# Patient Record
Sex: MALE | Race: WHITE | NOT HISPANIC OR LATINO | Employment: FULL TIME | URBAN - METROPOLITAN AREA
[De-identification: names, ages, dates, MRNs, and addresses within clinical notes are randomized per-mention and may not be internally consistent; named-entity substitution may affect disease eponyms.]

---

## 2017-02-09 RX ORDER — GLIMEPIRIDE 4 MG/1
4 TABLET ORAL
Qty: 30 TABLET | Refills: 1 | Status: SHIPPED | OUTPATIENT
Start: 2017-02-09 | End: 2017-03-14 | Stop reason: SDUPTHER

## 2017-03-14 RX ORDER — PAROXETINE HYDROCHLORIDE 20 MG/1
20 TABLET, FILM COATED ORAL EVERY MORNING
Qty: 30 TABLET | Refills: 1 | Status: SHIPPED | OUTPATIENT
Start: 2017-03-14 | End: 2017-03-24 | Stop reason: SDUPTHER

## 2017-03-14 RX ORDER — METFORMIN HYDROCHLORIDE 500 MG/1
500 TABLET, EXTENDED RELEASE ORAL
Qty: 30 TABLET | Refills: 1 | Status: SHIPPED | OUTPATIENT
Start: 2017-03-14 | End: 2017-03-24 | Stop reason: SDUPTHER

## 2017-03-14 RX ORDER — GLIMEPIRIDE 4 MG/1
4 TABLET ORAL
Qty: 30 TABLET | Refills: 1 | Status: SHIPPED | OUTPATIENT
Start: 2017-03-14 | End: 2017-03-24 | Stop reason: SDUPTHER

## 2017-03-14 NOTE — TELEPHONE ENCOUNTER
----- Message from Thad Mcmahan sent at 3/14/2017  1:29 PM CDT -----  Contact: Pt  Pt is returning a call. Pt states his Rx numbers are:  Metformin  [id number]  Paroxetine [id number]  Glimpiride  [id number]  He can be reached at [phone]    [address] [place]  [address] [facility]  [address] [facility]  [place] [address]  Phone: [phone] Fax: [phone]

## 2017-03-14 NOTE — TELEPHONE ENCOUNTER
----- Message from Manuel Mixes sent at 3/14/2017  6:57 AM CDT -----  Contact: [phone]   Pt would like refills called in on Rx medication until scheduled appt on Next Friday.  Please call back @ [phone].  Thanks-[facility]       Pt Uses:    [address] [place] - [address]  [address] [facility] [place] [address]  Phone: [phone] Fax: [phone]

## 2017-03-19 RX ORDER — METFORMIN HYDROCHLORIDE 500 MG/1
TABLET, EXTENDED RELEASE ORAL
Qty: 30 TABLET | Refills: 0 | OUTPATIENT
Start: 2017-03-19

## 2017-03-20 ENCOUNTER — TELEPHONE (OUTPATIENT)
Dept: DIABETES | Facility: CLINIC | Age: 52
End: 2017-03-20

## 2017-03-20 NOTE — TELEPHONE ENCOUNTER
----- Message from Becki Mcmahan sent at 3/20/2017  9:59 AM CDT -----  States calling to check the status of extension of medication. Please adv/call [phone].//thanks. cw

## 2017-03-24 ENCOUNTER — OFFICE VISIT (OUTPATIENT)
Dept: DIABETES | Facility: CLINIC | Age: 52
End: 2017-03-24

## 2017-03-24 VITALS
DIASTOLIC BLOOD PRESSURE: 92 MMHG | WEIGHT: 174.19 LBS | BODY MASS INDEX: 25.8 KG/M2 | HEIGHT: 69 IN | SYSTOLIC BLOOD PRESSURE: 144 MMHG

## 2017-03-24 DIAGNOSIS — R03.0 BLOOD PRESSURE ELEVATED WITHOUT HISTORY OF HTN: ICD-10-CM

## 2017-03-24 PROCEDURE — 99212 OFFICE O/P EST SF 10 MIN: CPT | Mod: S$PBB,,, | Performed by: PHYSICIAN ASSISTANT

## 2017-03-24 PROCEDURE — 99999 PR PBB SHADOW E&M-EST. PATIENT-LVL II: CPT | Mod: PBBFAC,,, | Performed by: PHYSICIAN ASSISTANT

## 2017-03-24 PROCEDURE — 99212 OFFICE O/P EST SF 10 MIN: CPT | Mod: PBBFAC,PO | Performed by: PHYSICIAN ASSISTANT

## 2017-03-24 RX ORDER — GLIMEPIRIDE 4 MG/1
4 TABLET ORAL
Qty: 30 TABLET | Refills: 11 | Status: SHIPPED | OUTPATIENT
Start: 2017-03-24 | End: 2017-05-16 | Stop reason: SDUPTHER

## 2017-03-24 RX ORDER — SILDENAFIL 100 MG/1
100 TABLET, FILM COATED ORAL DAILY PRN
Qty: 20 TABLET | Refills: 11 | Status: SHIPPED | OUTPATIENT
Start: 2017-03-24 | End: 2018-05-17

## 2017-03-24 RX ORDER — PIOGLITAZONEHYDROCHLORIDE 15 MG/1
15 TABLET ORAL DAILY
Qty: 30 TABLET | Refills: 11 | Status: SHIPPED | OUTPATIENT
Start: 2017-03-24 | End: 2018-05-16 | Stop reason: SDUPTHER

## 2017-03-24 RX ORDER — PAROXETINE HYDROCHLORIDE 20 MG/1
20 TABLET, FILM COATED ORAL EVERY MORNING
Qty: 30 TABLET | Refills: 11 | Status: SHIPPED | OUTPATIENT
Start: 2017-03-24 | End: 2017-05-16 | Stop reason: SDUPTHER

## 2017-03-24 RX ORDER — METFORMIN HYDROCHLORIDE 500 MG/1
500 TABLET, EXTENDED RELEASE ORAL
Qty: 30 TABLET | Refills: 11 | Status: SHIPPED | OUTPATIENT
Start: 2017-03-24 | End: 2018-04-02 | Stop reason: SDUPTHER

## 2017-03-24 NOTE — PROGRESS NOTES
Subjective:      Patient ID: Vasu Garg Jr. is a 52 y.o. male.    PCP: Deacon Roemro Jr, PA-C      Vasu Garg is a pleasant 52 y.o. male presenting to follow up on diabetes mellitus. He has had diabetes for 10 or more years. His last visit in Diabetes Management was Visit date not found Since that time he has had no improvement in his symptoms.  His current concerns are glycemia..    He denies any hospital admissions, emergency room visits, hypoglycemia, syncope, diaphoresis, chest pain, or dyspnea.    He has gained 6 pounds since last visit. His BMI is 25.72    His blood sugar in the clinic today was: 267 mg/dl on 3/24/2017 @ 12:58 PM      We discussed the American diabetes Association recommendations:  hemoglobin A1c below 7.0%; all diabetics should be on statins unless contraindicated; one aspirin daily unless contraindicated; fasting blood sugar between 80 and 130 mg/dL; postprandial blood sugar below 180 mg/dl; prevention of hypoglycemia, may adjust goals to higher levels if persistent; ACE or ARB therapy if not contraindicated; and maintain in an ideal body weight with BMI below 25.    Vasu is compliant most of the time with DM medications.     Vasu is compliant most of the time with lifestyle modifications to include activity and meal planning.       STANDARDS OF CARE:  Eye doctor: Dr. franklin, last exam unk.  Dental exam: Recommend regular exams; denies gums bleeding.  Podiatry doctor:    ACTIVITY LEVEL: He exercises rarely.  MEAL PLANNING: Number of meals per day - 3. Number of snacks per day - 2.  Per dietary recall, patient is not limiting carbohydrates, saturated fats and sodium.   BLOOD GLUCOSE TESTING: Self-monitoring with   SOCIAL HISTORY: . Lives with spouse. Employed;  no tobacco use    Review of patient's allergies indicates:  No Known Allergies    History reviewed. No pertinent past medical history.    Review of Systems   Constitutional: Negative.  Negative for activity  change, appetite change, chills, diaphoresis, fatigue, fever and unexpected weight change.   HENT: Negative.  Negative for dental problem, facial swelling, hearing loss, nosebleeds, trouble swallowing and voice change.    Eyes: Negative.  Negative for photophobia, pain, discharge, redness, itching and visual disturbance.   Respiratory: Negative.  Negative for cough, choking, chest tightness, shortness of breath and wheezing.    Cardiovascular: Negative.  Negative for chest pain, palpitations and leg swelling.   Gastrointestinal: Negative.  Negative for abdominal distention, abdominal pain, blood in stool, constipation, diarrhea, nausea and vomiting.   Endocrine: Negative.  Negative for cold intolerance, heat intolerance, polydipsia, polyphagia and polyuria.   Genitourinary: Negative.  Negative for decreased urine volume, difficulty urinating, dysuria, frequency, scrotal swelling, testicular pain and urgency.   Musculoskeletal: Negative.  Negative for arthralgias, back pain, gait problem, joint swelling, myalgias, neck pain and neck stiffness.   Skin: Negative.  Negative for color change, pallor, rash and wound.   Allergic/Immunologic: Negative.  Negative for environmental allergies, food allergies and immunocompromised state.   Neurological: Negative.  Negative for dizziness, tremors, seizures, syncope, facial asymmetry, speech difficulty, weakness, light-headedness, numbness and headaches.   Hematological: Negative.  Negative for adenopathy. Does not bruise/bleed easily.   Psychiatric/Behavioral: Negative.  Negative for agitation, behavioral problems, confusion, decreased concentration, dysphoric mood, hallucinations, self-injury, sleep disturbance and suicidal ideas. The patient is not nervous/anxious and is not hyperactive.       Objective:     Vitals - 1 value per visit 2/17/2016 3/24/2017   SYSTOLIC 126 144   DIASTOLIC 82 92   PULSE 84 -   TEMPERATURE 99.1 -   RESPIRATIONS 16 -   Weight (lb) 168.65 174.16  "  HEIGHT 5' 7" 5' 9"   BODY MASS INDEX 26.41 25.72   VISIT REPORT - -       Physical Exam   Constitutional: He is oriented to person, place, and time. He appears well-developed and well-nourished. He is cooperative.  Non-toxic appearance. He does not have a sickly appearance. He does not appear ill. No distress. He is not intubated.   HENT:   Head: Normocephalic and atraumatic. Not macrocephalic and not microcephalic. Head is without raccoon's eyes, without Gordon's sign, without abrasion, without contusion, without laceration, without right periorbital erythema and without left periorbital erythema. Hair is normal.   Right Ear: External ear normal. No lacerations. No drainage, swelling or tenderness. No foreign bodies. No mastoid tenderness. Tympanic membrane is not injected, not scarred, not perforated, not erythematous, not retracted and not bulging. Tympanic membrane mobility is normal. No middle ear effusion. No hemotympanum. No decreased hearing is noted.   Left Ear: External ear normal. No lacerations. No drainage, swelling or tenderness. No foreign bodies. No mastoid tenderness. Tympanic membrane is not injected, not scarred, not perforated, not erythematous, not retracted and not bulging. Tympanic membrane mobility is normal.  No middle ear effusion. No hemotympanum. No decreased hearing is noted.   Nose: Nose normal.   Mouth/Throat: Oropharynx is clear and moist.   Eyes: EOM and lids are normal. Pupils are equal, round, and reactive to light. Right eye exhibits no chemosis, no discharge, no exudate and no hordeolum. No foreign body present in the right eye. Left eye exhibits no chemosis, no discharge, no exudate and no hordeolum. No foreign body present in the left eye. Right conjunctiva is not injected. Right conjunctiva has no hemorrhage. Left conjunctiva is not injected. Left conjunctiva has no hemorrhage. No scleral icterus. Right eye exhibits normal extraocular motion and no nystagmus. Left eye " exhibits normal extraocular motion and no nystagmus. Right pupil is round and reactive. Left pupil is round and reactive. Pupils are equal.   Neck: Normal range of motion, full passive range of motion without pain and phonation normal. Neck supple. Normal carotid pulses, no hepatojugular reflux and no JVD present. No tracheal tenderness, no spinous process tenderness and no muscular tenderness present. Carotid bruit is not present. No rigidity. No tracheal deviation, no edema, no erythema and normal range of motion present. No thyroid mass and no thyromegaly present.   Cardiovascular: Normal rate, regular rhythm, normal heart sounds and intact distal pulses.   No extrasystoles are present. PMI is not displaced.  Exam reveals no gallop, no friction rub and no decreased pulses.    No murmur heard.  Pulses:       Carotid pulses are 2+ on the right side, and 2+ on the left side.       Radial pulses are 2+ on the right side, and 2+ on the left side.        Dorsalis pedis pulses are 2+ on the right side, and 2+ on the left side.        Posterior tibial pulses are 2+ on the right side, and 2+ on the left side.   Pulmonary/Chest: Effort normal and breath sounds normal. No accessory muscle usage or stridor. No apnea, no tachypnea and no bradypnea. He is not intubated. No respiratory distress. He has no decreased breath sounds. He has no wheezes. He has no rhonchi. He has no rales. He exhibits no tenderness.   Abdominal: Soft. Bowel sounds are normal. He exhibits no shifting dullness, no distension, no pulsatile liver, no fluid wave, no abdominal bruit, no ascites, no pulsatile midline mass and no mass. There is no hepatosplenomegaly, splenomegaly or hepatomegaly. There is no tenderness. There is no rigidity, no rebound, no guarding, no CVA tenderness and no tenderness at McBurney's point. No hernia. Hernia confirmed negative in the ventral area.   Musculoskeletal: Normal range of motion. He exhibits no edema or tenderness.         Right foot: There is normal range of motion and no deformity.        Left foot: There is normal range of motion and no deformity.   Feet:   Right Foot:   Protective Sensation: 6 sites tested. 6 sites sensed.   Skin Integrity: Negative for ulcer, blister, skin breakdown, erythema, warmth, callus or dry skin.   Left Foot:   Protective Sensation: 6 sites tested. 6 sites sensed.   Skin Integrity: Negative for ulcer, blister, skin breakdown, erythema, warmth, callus or dry skin.   Lymphadenopathy:        Head (right side): No submental, no submandibular, no tonsillar, no preauricular, no posterior auricular and no occipital adenopathy present.        Head (left side): No submental, no submandibular, no tonsillar, no preauricular, no posterior auricular and no occipital adenopathy present.     He has no cervical adenopathy.        Right cervical: No superficial cervical, no deep cervical and no posterior cervical adenopathy present.       Left cervical: No superficial cervical, no deep cervical and no posterior cervical adenopathy present.   Neurological: He is alert and oriented to person, place, and time. He has normal reflexes. He displays no atrophy and no tremor. No cranial nerve deficit or sensory deficit. He exhibits normal muscle tone. He displays no seizure activity. Coordination and gait normal.   Reflex Scores:       Bicep reflexes are 2+ on the right side and 2+ on the left side.       Brachioradialis reflexes are 2+ on the right side and 2+ on the left side.       Patellar reflexes are 2+ on the right side and 2+ on the left side.  Skin: Skin is warm and dry. No rash noted. No erythema. No pallor.   Psychiatric: He has a normal mood and affect. His mood appears not anxious. His affect is not angry, not blunt, not labile and not inappropriate. His speech is not rapid and/or pressured, not delayed, not tangential and not slurred. He is not agitated, not aggressive, not hyperactive, not slowed, not  withdrawn, not actively hallucinating and not combative. Thought content is not paranoid and not delusional. Cognition and memory are not impaired. He does not express impulsivity or inappropriate judgment. He does not exhibit a depressed mood. He expresses no homicidal and no suicidal ideation. He expresses no suicidal plans and no homicidal plans. He is communicative. He exhibits normal recent memory and normal remote memory. He is attentive.     Assessment:     1. Uncontrolled type 2 diabetes mellitus without complication, without long-term current use of insulin      Plan:     Vasu Garg is seen today for   1. Uncontrolled type 2 diabetes mellitus without complication, without long-term current use of insulin      We have discussed the etiology and treatment options associated with the diagnosis as well as alternatives. He has elected the following treatments.     Uncontrolled type 2 diabetes mellitus without complication, without long-term current use of insulin  Comments:  We'll add Actos 15 mg once daily; advised to check blood sugar more frequently; follow-up in 6 months; continue other current medications as directed.  Orders:  -     pioglitazone (ACTOS) 15 MG tablet; Take 1 tablet (15 mg total) by mouth once daily.  Dispense: 30 tablet; Refill: 11  -     glimepiride (AMARYL) 4 MG tablet; Take 1 tablet (4 mg total) by mouth before breakfast.  Dispense: 30 tablet; Refill: 11  -     metformin (GLUCOPHAGE XR) 500 MG 24 hr tablet; Take 1 tablet (500 mg total) by mouth daily with breakfast.  Dispense: 30 tablet; Refill: 11  -     paroxetine (PAXIL) 20 MG tablet; Take 1 tablet (20 mg total) by mouth every morning.  Dispense: 30 tablet; Refill: 11  -     sildenafil (VIAGRA) 100 MG tablet; Take 1 tablet (100 mg total) by mouth daily as needed for Erectile Dysfunction.  Dispense: 20 tablet; Refill: 11      1.) Patient was instructed to monitor blood glucose twice daily, fasting, and 2 hour post meal; if on  Multiple Daily Injections (MDI) he will need to have pre-meal blood glucose as well. Reminded to bring BG meter or record to each visit for review.  2.) Reviewed pathophysiology of type 2 diabetes, complications related to the disease, importance of annual dilated eye exam and self daily foot examination.  3.) Continue medications as prescribed Metformin; Amaryl; Paxil; Viagra. [facility] MyChart or Phone review in 1 week with BG records for adjustment of medication.  4.) Reviewed carb counting, portion control, importance of spacing meals throughout the day to prevent post prandial elevations. Recommended low saturated fat, low sodium diet to aid in control of hypertension and cholesterol.  5.) Discussed activity, benefits, methods, and precautions. Recommended patient start/continue some form of exercise and increase as tolerated to 60 minutes per day to facilitate weight loss and aid in control of BGs. Also reminded patient of WHO recommendation of 10,000 steps daily as a goal.   6.) A1C, TSH, Lipid Panel, CMP with eGFR and Micro/Creatinine per ADA protocol.  7.) Return to clinic in 6 months for follow up. Advised patient to call clinic with any questions or concerns.    A total of 45 minutes was spent in face to face time, of which 50 % was spent in counseling patient on disease process, complications, treatment, and side effects of medications.    The patient was explained the above plan and given opportunity to ask questions.  He understands, chooses and consents to this plan and accepts all the risks, which include but are not limited to the risks mentioned above.   He understands the alternative of having no testing, interventions or treatments at this time. He left content and without further questions.

## 2017-05-16 RX ORDER — GLIMEPIRIDE 4 MG/1
TABLET ORAL
Qty: 30 TABLET | Refills: 0 | Status: SHIPPED | OUTPATIENT
Start: 2017-05-16 | End: 2018-03-25 | Stop reason: SDUPTHER

## 2017-05-16 RX ORDER — PAROXETINE HYDROCHLORIDE 20 MG/1
TABLET, FILM COATED ORAL
Qty: 30 TABLET | Refills: 0 | Status: SHIPPED | OUTPATIENT
Start: 2017-05-16 | End: 2018-04-02 | Stop reason: SDUPTHER

## 2017-05-17 RX ORDER — GLIMEPIRIDE 4 MG/1
TABLET ORAL
Qty: 30 TABLET | Refills: 6 | Status: SHIPPED | OUTPATIENT
Start: 2017-05-17 | End: 2018-03-25 | Stop reason: SDUPTHER

## 2017-05-17 RX ORDER — PAROXETINE HYDROCHLORIDE 20 MG/1
TABLET, FILM COATED ORAL
Qty: 30 TABLET | Refills: 6 | Status: SHIPPED | OUTPATIENT
Start: 2017-05-17 | End: 2018-04-02 | Stop reason: SDUPTHER

## 2018-03-25 RX ORDER — GLIMEPIRIDE 4 MG/1
TABLET ORAL
Qty: 30 TABLET | Refills: 1 | Status: SHIPPED | OUTPATIENT
Start: 2018-03-25 | End: 2018-04-02 | Stop reason: SDUPTHER

## 2018-03-26 ENCOUNTER — TELEPHONE (OUTPATIENT)
Dept: DIABETES | Facility: CLINIC | Age: 53
End: 2018-03-26

## 2018-03-26 NOTE — TELEPHONE ENCOUNTER
Spoke with wife regarding appointment she stated he no longer have insurance and want to know what other options do she have. I told patient I will retain the information for her and give her a call back. She stated patient is at work and cant answer his phone.

## 2018-04-02 RX ORDER — PAROXETINE HYDROCHLORIDE 20 MG/1
TABLET, FILM COATED ORAL
Qty: 30 TABLET | Refills: 11 | Status: SHIPPED | OUTPATIENT
Start: 2018-04-02 | End: 2018-04-03 | Stop reason: SDUPTHER

## 2018-04-02 NOTE — TELEPHONE ENCOUNTER
----- Message from Neli Molina sent at 4/2/2018  8:48 AM CDT -----  Contact: spouse  Would like to consult with  nurse regarding rx diabetes  medication . Please call back at [phone].        Thanks,  Neli Molina

## 2018-04-03 RX ORDER — PAROXETINE HYDROCHLORIDE 20 MG/1
20 TABLET, FILM COATED ORAL EVERY MORNING
Qty: 30 TABLET | Refills: 6 | Status: SHIPPED | OUTPATIENT
Start: 2018-04-03 | End: 2018-05-17 | Stop reason: SDUPTHER

## 2018-04-03 RX ORDER — GLIMEPIRIDE 4 MG/1
4 TABLET ORAL
Qty: 30 TABLET | Refills: 1 | Status: SHIPPED | OUTPATIENT
Start: 2018-04-03 | End: 2018-05-17 | Stop reason: SDUPTHER

## 2018-04-03 RX ORDER — METFORMIN HYDROCHLORIDE 500 MG/1
500 TABLET, EXTENDED RELEASE ORAL
Qty: 30 TABLET | Refills: 1 | Status: SHIPPED | OUTPATIENT
Start: 2018-04-03 | End: 2018-05-16

## 2018-04-09 ENCOUNTER — TELEPHONE (OUTPATIENT)
Dept: DIABETES | Facility: CLINIC | Age: 53
End: 2018-04-09

## 2018-04-09 NOTE — TELEPHONE ENCOUNTER
Spoke with wife told her the prescription was refilled on 04/03/2018 to check with pharmacy. Wife verbalized understanding.

## 2018-04-09 NOTE — TELEPHONE ENCOUNTER
----- Message from Meredith Staley sent at 4/9/2018  7:39 AM CDT -----  Contact: Mary beck wife  Mary needs to speak to the nurse regarding his paroxotine 20 mg being called in one more month to [facility] [address]/please call [phone]/ma

## 2018-04-09 NOTE — TELEPHONE ENCOUNTER
Spoke with patient wife (marleni) stated that Mr. Leone is in need of Paroxetine because she concern about him loosing his job. Wife stated patient is very angry and aggressive without this medication and his BG is spiking up. Wife stated patient been without medication for a week. Wife stated patient does not have insurance at the moment. Please advise.

## 2018-05-16 ENCOUNTER — TELEPHONE (OUTPATIENT)
Dept: INTERNAL MEDICINE | Facility: CLINIC | Age: 53
End: 2018-05-16

## 2018-05-16 RX ORDER — METFORMIN HYDROCHLORIDE 500 MG/1
TABLET, EXTENDED RELEASE ORAL
Qty: 30 TABLET | Refills: 1 | Status: SHIPPED | OUTPATIENT
Start: 2018-05-16 | End: 2018-05-17

## 2018-05-16 RX ORDER — PIOGLITAZONEHYDROCHLORIDE 15 MG/1
TABLET ORAL
Qty: 30 TABLET | Refills: 1 | Status: SHIPPED | OUTPATIENT
Start: 2018-05-16 | End: 2018-05-17 | Stop reason: SDUPTHER

## 2018-05-16 NOTE — TELEPHONE ENCOUNTER
----- Message from Manuel Tatum sent at 5/16/2018  9:21 AM CDT -----  Contact: Soila Hernandez- [phone]  Would like to consult with the nurse about blood sugar readings,  Pt's blood sugar is high and need a refill on rx medication.  Please call back at [phone].  Thx-AH

## 2018-05-16 NOTE — TELEPHONE ENCOUNTER
II spoke with the pharmacy [facility] [address] [facility]. And they says he has 4 refills on the metformin and 1 refill on the glimepiride. She is to pick them up. [place] in diabetes  says diabetes in now under primary care and the co pay should be the same as primary care and not specialty. The patient does not have insurance right now. I advised her to check with Mr. Worthy to verify and maybe schedule with him instead right now because of money. She will check with them.

## 2018-05-16 NOTE — TELEPHONE ENCOUNTER
II spoke with the pharmacy [facility] [address] [facility]. And they says he has 4 refills on the metformin and 1 refill on the glimepiride. She is to pick them up. [facility] [place] in diabetes  says diabetes in now under primary care and the co pay should be the same as primary care and not specialty. The patient does not have insurance right now. I advised her to check with Mr. Worthy to verify and maybe schedule with him instead right now because of money. She will check with them.

## 2018-05-17 ENCOUNTER — OFFICE VISIT (OUTPATIENT)
Dept: INTERNAL MEDICINE | Facility: CLINIC | Age: 53
End: 2018-05-17

## 2018-05-17 VITALS
WEIGHT: 178.38 LBS | DIASTOLIC BLOOD PRESSURE: 80 MMHG | OXYGEN SATURATION: 96 % | HEIGHT: 69 IN | BODY MASS INDEX: 26.42 KG/M2 | TEMPERATURE: 98 F | SYSTOLIC BLOOD PRESSURE: 126 MMHG | HEART RATE: 104 BPM

## 2018-05-17 DIAGNOSIS — N52.9 ERECTILE DYSFUNCTION, UNSPECIFIED ERECTILE DYSFUNCTION TYPE: ICD-10-CM

## 2018-05-17 DIAGNOSIS — Z59.9 FINANCIAL DIFFICULTIES: ICD-10-CM

## 2018-05-17 DIAGNOSIS — F39 MOOD DISORDER: ICD-10-CM

## 2018-05-17 PROCEDURE — 99203 OFFICE O/P NEW LOW 30 MIN: CPT | Mod: S$PBB,,, | Performed by: FAMILY MEDICINE

## 2018-05-17 PROCEDURE — 99999 PR PBB SHADOW E&M-EST. PATIENT-LVL III: CPT | Mod: PBBFAC,,, | Performed by: FAMILY MEDICINE

## 2018-05-17 PROCEDURE — 99213 OFFICE O/P EST LOW 20 MIN: CPT | Mod: PBBFAC | Performed by: FAMILY MEDICINE

## 2018-05-17 RX ORDER — PIOGLITAZONEHYDROCHLORIDE 15 MG/1
15 TABLET ORAL DAILY
Qty: 90 TABLET | Refills: 1 | Status: SHIPPED | OUTPATIENT
Start: 2018-05-17

## 2018-05-17 RX ORDER — PAROXETINE HYDROCHLORIDE 20 MG/1
20 TABLET, FILM COATED ORAL EVERY MORNING
Qty: 90 TABLET | Refills: 1 | Status: SHIPPED | OUTPATIENT
Start: 2018-05-17

## 2018-05-17 RX ORDER — GLIMEPIRIDE 4 MG/1
4 TABLET ORAL
Qty: 90 TABLET | Refills: 1 | Status: SHIPPED | OUTPATIENT
Start: 2018-05-17

## 2018-05-17 RX ORDER — SILDENAFIL 50 MG/1
50 TABLET, FILM COATED ORAL DAILY PRN
Qty: 10 TABLET | Refills: 0 | Status: SHIPPED | OUTPATIENT
Start: 2018-05-17 | End: 2019-05-17

## 2018-05-17 RX ORDER — METFORMIN HYDROCHLORIDE 500 MG/1
500 TABLET ORAL 2 TIMES DAILY WITH MEALS
Qty: 90 TABLET | Refills: 0 | Status: SHIPPED | OUTPATIENT
Start: 2018-05-17 | End: 2018-08-27 | Stop reason: SDUPTHER

## 2018-05-17 SDOH — SOCIAL DETERMINANTS OF HEALTH (SDOH): PROBLEM RELATED TO HOUSING AND ECONOMIC CIRCUMSTANCES, UNSPECIFIED: Z59.9

## 2018-05-17 NOTE — PROGRESS NOTES
Subjective:       Patient ID: Vasu Garg Jr. is a 53 y.o. male.    Chief Complaint: Establish Care    HPI     53    PMH   Diabetes,  Anger?   Started on Paxil  Has improve mood.      10 years ago -   Diagnosis of diabetes.    Formerly went to Diabetic education.     Wife reports he doesn't handle his DM well.  Drinks coke zero.    Lost insurance.  Private pay.     Saw CHUCHO Romero -   Was given 1 year of prescriptions.  Was going to follow up - was going to be $500 as he is a Diabetic .     Has had severely elevated blood glucose.   No idea what last A1c was.         Review of Systems   Constitutional: Negative for chills and fever.   HENT: Negative for trouble swallowing.    Eyes: Negative for visual disturbance.   Respiratory: Negative for shortness of breath.    Cardiovascular: Negative for chest pain.   Gastrointestinal: Negative for constipation and diarrhea.   Genitourinary: Negative for difficulty urinating.   Musculoskeletal: Negative for gait problem.   Skin: Negative for rash.   Neurological: Negative for dizziness and light-headedness.   Psychiatric/Behavioral: Negative for dysphoric mood.       Objective:       Vitals:    05/17/18 1610   BP: 126/80   Pulse: 104   Temp: 97.8 °F (36.6 °C)       Physical Exam   Constitutional: He is oriented to person, place, and time. He appears well-developed and well-nourished. No distress.   HENT:   Head: Normocephalic and atraumatic.   Right Ear: Hearing, tympanic membrane, external ear and ear canal normal.   Left Ear: Hearing, tympanic membrane, external ear and ear canal normal.   Nose: Nose normal. Right sinus exhibits no maxillary sinus tenderness and no frontal sinus tenderness. Left sinus exhibits no maxillary sinus tenderness and no frontal sinus tenderness.   Mouth/Throat: Uvula is midline, oropharynx is clear and moist and mucous membranes are normal.   Eyes: Conjunctivae are normal. Right eye exhibits no discharge. Left eye  exhibits no discharge.   Neck: Trachea normal, normal range of motion and full passive range of motion without pain.   Cardiovascular: Normal rate, regular rhythm, normal heart sounds and intact distal pulses.    Pulmonary/Chest: Effort normal and breath sounds normal. No respiratory distress. He has no decreased breath sounds. He has no wheezes.   Abdominal: Soft. Normal appearance and bowel sounds are normal. He exhibits no distension and no mass. There is no tenderness. There is no guarding. No hernia.   Musculoskeletal: Normal range of motion. He exhibits no edema or deformity.   Lymphadenopathy:     He has no cervical adenopathy.   Neurological: He is alert and oriented to person, place, and time.   Skin: Skin is warm, dry and intact. No rash noted. No erythema. No pallor.   Psychiatric: He has a normal mood and affect. His speech is normal and behavior is normal. Thought content normal.       Assessment:       1. Uncontrolled type 2 diabetes mellitus without complication, without long-term current use of insulin    2. Erectile dysfunction, unspecified erectile dysfunction type    3. Financial difficulties    4. Mood disorder        Plan:   Uncontrolled type 2 diabetes mellitus without complication, without long-term current use of insulin    This is a complicated and unfortunate case. Patient has what I suspect to be uncontrolled and severe DM II. He also has severe financial limitations. He adamently refuses insulin therapy - we discussed this at risk and my concern oral medications would not be sufficient and potentially hazardous given his unknown renal / hepatic / electrolyte status. Informed him I will continue medication prescription but he is assuming a risk of taking medications without appropriate lab work. I ordered labs- and encouraged him to inquire fee for such and emphasized important of at minimum obtaining CMP and A1c. Also discussed what is needed for appropriate DM II mngt - eye exam, foot  exam, lipid control, statin use, Renal monitoring, ACE-I use, goal A1c of 7, etc    Additionally concerned patient does not manage his diet appropriately at all - given his wife's concern he doesn't help himself nutritionally speaking.     Advised he seek out [facility] or income based clinics for closer control as I imagine [facility] fees would be high.     Patient is aware of risk and is hoping to obtain insurance in order to better control his chronic conditions.           Orders:  -     metFORMIN (GLUCOPHAGE) 500 MG tablet; Take 1 tablet (500 mg total) by mouth 2 (two) times daily with meals.  Dispense: 90 tablet; Refill: 0  -     pioglitazone (ACTOS) 15 MG tablet; Take 1 tablet (15 mg total) by mouth once daily.  Dispense: 90 tablet; Refill: 1  -     glimepiride (AMARYL) 4 MG tablet; Take 1 tablet (4 mg total) by mouth before breakfast.  Dispense: 90 tablet; Refill: 1  -     CBC auto differential; Future; Expected date: 05/17/2018  -     Comprehensive metabolic panel; Future; Expected date: 05/17/2018  -     MICROALBUMIN / CREATININE RATIO URINE  -     Hemoglobin A1c; Future; Expected date: 05/17/2018  -     Lipid panel; Future; Expected date: 05/17/2018    Erectile dysfunction, unspecified erectile dysfunction type  -   -     sildenafil (VIAGRA) 50 MG tablet; Take 1 tablet (50 mg total) by mouth daily as needed for Erectile Dysfunction.  Dispense: 10 tablet; Refill: 0    Discussed extensive cost of such medication  As he tolerates activity with no limitations and has tried viagra in past willing to prescribe. Encourage DM II medications prior. Consider penis pump.       Financial difficulties    Encouraged to discuss with daughter - in social work.    Mood disorder    Wife reports and patient agress much better off on paxil.  -     paroxetine (PAXIL) 20 MG tablet; Take 1 tablet (20 mg total) by mouth every morning.  Dispense: 90 tablet; Refill: 1        Follow up in 3 months or based on labs if able.    No  Follow-up on file.

## 2018-05-22 ENCOUNTER — PATIENT OUTREACH (OUTPATIENT)
Dept: ADMINISTRATIVE | Facility: HOSPITAL | Age: 53
End: 2018-05-22

## 2018-06-01 DIAGNOSIS — E11.9 TYPE 2 DIABETES MELLITUS WITHOUT COMPLICATION: ICD-10-CM

## 2018-06-05 ENCOUNTER — PATIENT OUTREACH (OUTPATIENT)
Dept: ADMINISTRATIVE | Facility: HOSPITAL | Age: 53
End: 2018-06-05

## 2018-08-27 RX ORDER — METFORMIN HYDROCHLORIDE 500 MG/1
TABLET ORAL
Qty: 90 TABLET | Refills: 0 | Status: SHIPPED | OUTPATIENT
Start: 2018-08-27

## 2018-09-14 ENCOUNTER — TELEPHONE (OUTPATIENT)
Dept: INTERNAL MEDICINE | Facility: CLINIC | Age: 53
End: 2018-09-14

## 2018-09-14 NOTE — TELEPHONE ENCOUNTER
----- Message from Neli Molina sent at 9/14/2018  4:07 PM CDT -----  Contact: spouse   Returning call. Please call back at [phone].      Thanks,  Neli Molina

## 2018-09-14 NOTE — TELEPHONE ENCOUNTER
Called patient to let him know that the lab will be printed and signed waiting for them to  at the clinic /sl

## 2018-09-14 NOTE — TELEPHONE ENCOUNTER
----- Message from Aby Downey sent at 9/14/2018 12:40 PM CDT -----  Contact: Maria Elena/wife  Maria Elena called and stated that the patient wants to go to [facility] and she needs orders for him to be called in because he is going next week and needs to do the labs in order to get his medication. She can be contacted at [phone].    Thanks,  Aby

## 2018-09-17 RX ORDER — GLIMEPIRIDE 4 MG/1
4 TABLET ORAL
Qty: 90 TABLET | Refills: 1 | OUTPATIENT
Start: 2018-09-17

## 2018-09-17 RX ORDER — METFORMIN HYDROCHLORIDE 500 MG/1
500 TABLET ORAL 2 TIMES DAILY WITH MEALS
Qty: 90 TABLET | Refills: 0 | OUTPATIENT
Start: 2018-09-17

## 2018-09-17 NOTE — TELEPHONE ENCOUNTER
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

## 2018-09-17 NOTE — TELEPHONE ENCOUNTER
Calling patient back and forth about the about the medication and getting the lab work the patient is scheduled for Thursday to have blood work done. /sl/

## 2018-09-19 ENCOUNTER — TELEPHONE (OUTPATIENT)
Dept: INTERNAL MEDICINE | Facility: CLINIC | Age: 53
End: 2018-09-19

## 2018-09-19 NOTE — TELEPHONE ENCOUNTER
----- Message from Simon Faust sent at 9/19/2018 10:26 AM CDT -----  Contact: pt's spouse  She's calling in regards to the pt's lab orders, [phone] (home)

## 2018-09-19 NOTE — TELEPHONE ENCOUNTER
Spoke with patient's wife. Patient had a blood sugar of 540 on yesterday and was able to get medication to help bring it down to 140 from a different doctor. Patient's wife states that they will not be returning to see Dr Dean because she feels as though he was not beneficial to her  as he would not fill a prescription for her 's medication with out getting lab work done before hand.

## 2019-10-16 ENCOUNTER — PATIENT OUTREACH (OUTPATIENT)
Dept: ADMINISTRATIVE | Facility: HOSPITAL | Age: 54
End: 2019-10-16

## 2021-10-27 NOTE — TELEPHONE ENCOUNTER
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